# Patient Record
Sex: FEMALE | Race: WHITE | Employment: OTHER | URBAN - METROPOLITAN AREA
[De-identification: names, ages, dates, MRNs, and addresses within clinical notes are randomized per-mention and may not be internally consistent; named-entity substitution may affect disease eponyms.]

---

## 2018-11-07 ENCOUNTER — OFFICE VISIT (OUTPATIENT)
Dept: URGENT CARE | Facility: URGENT CARE | Age: 53
End: 2018-11-07
Payer: COMMERCIAL

## 2018-11-07 VITALS
OXYGEN SATURATION: 95 % | DIASTOLIC BLOOD PRESSURE: 93 MMHG | SYSTOLIC BLOOD PRESSURE: 134 MMHG | TEMPERATURE: 97.4 F | HEART RATE: 95 BPM

## 2018-11-07 DIAGNOSIS — S61.209A AVULSION OF FINGERTIP, INITIAL ENCOUNTER: Primary | ICD-10-CM

## 2018-11-07 PROCEDURE — 12001 RPR S/N/AX/GEN/TRNK 2.5CM/<: CPT

## 2018-11-07 NOTE — MR AVS SNAPSHOT
"              After Visit Summary   2018    My Busby    MRN: [id number]           Patient Information     Date Of Birth          1965        Visit Information        Provider Department      2018 8:40 PM [facility]ENT [facility]        Today's Diagnoses     Avulsion of fingertip, initial encounter    -  1       Follow-ups after your visit        Who to contact     If you have questions or need follow up information about today's clinic visit or your schedule please contact [facility] directly at [phone].  Normal or non-critical lab and imaging results will be communicated to you by Solaire Generationhart, letter or phone within 4 business days after the clinic has received the results. If you do not hear from us within 7 days, please contact the clinic through Shoes4yout or phone. If you have a critical or abnormal lab result, we will notify you by phone as soon as possible.  Submit refill requests through Note or call your pharmacy and they will forward the refill request to us. Please allow 3 business days for your refill to be completed.          Additional Information About Your Visit        MyChart Information     Note lets you send messages to your doctor, view your test results, renew your prescriptions, schedule appointments and more. To sign up, go to [email or web address] . Click on \"Log in\" on the left side of the screen, which will take you to the Welcome page. Then click on \"Sign up Now\" on the right side of the page.     You will be asked to enter the access code listed below, as well as some personal information. Please follow the directions to create your username and password.     Your access code is: [email or web address]  Expires: 2019  8:57 AM     Your access code will  in 90 days. If you need help or a new code, please call your [facility] or [phone].        Care EveryWhere ID     This is your Care EveryWhere ID. This " could be used by other organizations to access your [place] medical records  [id number]        Your Vitals Were     Pulse Temperature Pulse Oximetry             95 97.4  F (36.3  C) (Oral) 95%          Blood Pressure from Last 3 Encounters:   11/07/18 (!) 134/93   02/08/16 110/72    Weight from Last 3 Encounters:   02/08/16 145 lb (65.8 kg)              We Performed the Following     N BLOCK INJ, COMMON DIGIT        Primary Care Provider Office Phone # Fax #    [facility] [phone]       [address] [place] [address] [phone]  [facility] [id number]        Equal Access to Services     [facility] : Hadii aad gerard hadasho Soomaali, waaxda luqadaha, qaybta kaalmada adeegyada, miko faust adestefany dunham . So [facility] [phone].    ATENCIÓN: Si habla español, tiene a le disposición servicios gratuitos de asistencia lingüística. Llame al [phone].    We comply with applicable federal civil rights laws and [place] laws. We do not discriminate on the basis of race, color, national origin, age, disability, sex, sexual orientation, or gender identity.            Thank you!     Thank you for choosing [facility]  for your care. Our goal is always to provide you with excellent care. Hearing back from our patients is one way we can continue to improve our services. Please take a few minutes to complete the written survey that you may receive in the mail after your visit with us. Thank you!             Your Updated Medication List - Protect others around you: Learn how to safely use, store and throw away your medicines at www.disposemymeds.org.          This list is accurate as of 11/7/18 11:59 PM.  Always use your most recent med list.                   Brand Name Dispense Instructions for use Diagnosis    calcium carbonate 500 mg (elemental) 500 MG tablet    OS-JOELLE     Take 500 mg by mouth 2 times daily        OMEGA-3 FISH OIL PO           VITAMIN B-12 PO            VITAMIN B6 PO           VITAMIN D (CHOLECALCIFEROL) PO      Take by mouth daily        ZOLOFT PO      Take by mouth daily

## 2018-11-08 NOTE — PROGRESS NOTES
HPI  Chief Complaint   Patient presents with     Laceration     right index finger 4pm today       Mandolin slice of R index finger while at home today about 3 hours ago. Hasn't stopped bleeding yet   It is painful  She ran it under water for awhile after it happened   Got tetanus yesterday     Past Medical History:   Diagnosis Date     Depressive disorder      PONV (postoperative nausea and vomiting)      No family history on file.  Past Surgical History:   Procedure Laterality Date     ABDOMINOPLASTY       CHOLECYSTECTOMY       COLONOSCOPY N/A 2/8/2016    Procedure: COLONOSCOPY;  Surgeon: Sabra Velasquez MD;  Location:  GI     FLEXIBLE SIGMOIDOSCOPY  2011     TONSILLECTOMY       Social History   Substance Use Topics     Smoking status: Current Some Day Smoker     Smokeless tobacco: Never Used     Alcohol use Yes      Comment: 3 drinks a week     Current Outpatient Prescriptions   Medication Sig Dispense Refill     calcium carbonate (OS-JOELLE 500 MG [place]. [place]) 500 MG tablet Take 500 mg by mouth 2 times daily       Cyanocobalamin (VITAMIN B-12 PO)        Omega-3 Fatty Acids (OMEGA-3 FISH OIL PO)        Pyridoxine HCl (VITAMIN B6 PO)        Sertraline HCl (ZOLOFT PO) Take by mouth daily       VITAMIN D, CHOLECALCIFEROL, PO Take by mouth daily       No Known Allergies    Reviewed and updated as needed this visit by clinical staff and provider       ROS  Detailed as above       BP (!) 134/93 (BP Location: Left arm)  Pulse 95  Temp 97.4  F (36.3  C) (Oral)  SpO2 95%    Physical Exam   Constitutional: She is well-developed, well-nourished, and in no distress.   HENT:   Head: Normocephalic.   Pulmonary/Chest: Effort normal.   Musculoskeletal: Normal range of motion.   Neurological: She is alert.   Skin: Skin is warm and dry.   1cm in diameter Avulsion of right 2nd fingerpad with active bleeding   Psychiatric: Mood and affect normal.   Vitals reviewed.    Procedure:   Digital Block:  Medication: 1% Lidocaine  4mL  Location: right 2nd finger  Finger was cleansed. I injected the medication at the base of the finger. Optimal anesthesia was obtained.  Tip of finger was then cauterized with silver nitrate to obtain hemostasis.  Pt tolerated well.      Assessment and Plan:       ICD-10-CM    1. Avulsion of fingertip, initial encounter S61.209A N BLOCK INJ, COMMON DIGIT       Fingertip avulsion. Completed digital block and cautery as above   Covered with bandage   Discussed home cares   F/u prn       LOTTIE Rivera, CNP  [facility]

## 2021-03-24 ENCOUNTER — TRANSFERRED RECORDS (OUTPATIENT)
Dept: HEALTH INFORMATION MANAGEMENT | Facility: CLINIC | Age: 56
End: 2021-03-24

## 2021-05-24 DIAGNOSIS — Z11.59 ENCOUNTER FOR SCREENING FOR OTHER VIRAL DISEASES: ICD-10-CM

## 2021-06-10 DIAGNOSIS — Z11.59 ENCOUNTER FOR SCREENING FOR OTHER VIRAL DISEASES: ICD-10-CM

## 2021-06-10 LAB
LABORATORY COMMENT REPORT: NORMAL
SARS-COV-2 RNA RESP QL NAA+PROBE: NEGATIVE
SARS-COV-2 RNA RESP QL NAA+PROBE: NORMAL
SPECIMEN SOURCE: NORMAL
SPECIMEN SOURCE: NORMAL

## 2021-06-10 PROCEDURE — U0005 INFEC AGEN DETEC AMPLI PROBE: HCPCS | Performed by: ORTHOPAEDIC SURGERY

## 2021-06-10 PROCEDURE — U0003 INFECTIOUS AGENT DETECTION BY NUCLEIC ACID (DNA OR RNA); SEVERE ACUTE RESPIRATORY SYNDROME CORONAVIRUS 2 (SARS-COV-2) (CORONAVIRUS DISEASE [COVID-19]), AMPLIFIED PROBE TECHNIQUE, MAKING USE OF HIGH THROUGHPUT TECHNOLOGIES AS DESCRIBED BY CMS-2020-01-R: HCPCS | Performed by: ORTHOPAEDIC SURGERY

## 2021-06-11 RX ORDER — IBUPROFEN 200 MG
800 TABLET ORAL DAILY
Status: ON HOLD | COMMUNITY
End: 2021-06-15

## 2021-06-11 RX ORDER — CETIRIZINE HYDROCHLORIDE 10 MG/1
10 TABLET ORAL EVERY EVENING
COMMUNITY

## 2021-06-11 RX ORDER — MULTIVITAMIN WITH IRON
1 TABLET ORAL DAILY
COMMUNITY

## 2021-06-11 RX ORDER — GABAPENTIN 300 MG/1
900 CAPSULE ORAL EVERY EVENING
COMMUNITY

## 2021-06-14 ENCOUNTER — ANESTHESIA (OUTPATIENT)
Dept: SURGERY | Facility: CLINIC | Age: 56
End: 2021-06-14
Payer: COMMERCIAL

## 2021-06-14 ENCOUNTER — ANESTHESIA EVENT (OUTPATIENT)
Dept: SURGERY | Facility: CLINIC | Age: 56
End: 2021-06-14
Payer: COMMERCIAL

## 2021-06-14 ENCOUNTER — APPOINTMENT (OUTPATIENT)
Dept: GENERAL RADIOLOGY | Facility: CLINIC | Age: 56
End: 2021-06-14
Attending: SPECIALIST/TECHNOLOGIST
Payer: COMMERCIAL

## 2021-06-14 ENCOUNTER — HOSPITAL ENCOUNTER (OUTPATIENT)
Facility: CLINIC | Age: 56
Discharge: HOME | End: 2021-06-15
Attending: ORTHOPAEDIC SURGERY | Admitting: ORTHOPAEDIC SURGERY
Payer: COMMERCIAL

## 2021-06-14 DIAGNOSIS — Z96.642 STATUS POST TOTAL HIP REPLACEMENT, LEFT: Primary | ICD-10-CM

## 2021-06-14 PROBLEM — M16.12 OSTEOARTHRITIS OF LEFT HIP: Status: ACTIVE | Noted: 2021-06-14

## 2021-06-14 LAB
ABO + RH BLD: NORMAL
ABO + RH BLD: NORMAL
BLD GP AB SCN SERPL QL: NORMAL
BLOOD BANK CMNT PATIENT-IMP: NORMAL
SPECIMEN EXP DATE BLD: NORMAL

## 2021-06-14 PROCEDURE — 250N000011 HC RX IP 250 OP 636: Performed by: SPECIALIST/TECHNOLOGIST

## 2021-06-14 PROCEDURE — 258N000003 HC RX IP 258 OP 636: Performed by: ANESTHESIOLOGY

## 2021-06-14 PROCEDURE — 86900 BLOOD TYPING SEROLOGIC ABO: CPT | Performed by: ANESTHESIOLOGY

## 2021-06-14 PROCEDURE — 999N000141 HC STATISTIC PRE-PROCEDURE NURSING ASSESSMENT: Performed by: ORTHOPAEDIC SURGERY

## 2021-06-14 PROCEDURE — 250N000013 HC RX MED GY IP 250 OP 250 PS 637: Performed by: PHYSICIAN ASSISTANT

## 2021-06-14 PROCEDURE — 250N000013 HC RX MED GY IP 250 OP 250 PS 637: Performed by: SPECIALIST/TECHNOLOGIST

## 2021-06-14 PROCEDURE — 258N000003 HC RX IP 258 OP 636: Performed by: NURSE ANESTHETIST, CERTIFIED REGISTERED

## 2021-06-14 PROCEDURE — 250N000011 HC RX IP 250 OP 636: Performed by: NURSE ANESTHETIST, CERTIFIED REGISTERED

## 2021-06-14 PROCEDURE — 86901 BLOOD TYPING SEROLOGIC RH(D): CPT | Performed by: ANESTHESIOLOGY

## 2021-06-14 PROCEDURE — 272N000001 HC OR GENERAL SUPPLY STERILE: Performed by: ORTHOPAEDIC SURGERY

## 2021-06-14 PROCEDURE — 250N000009 HC RX 250: Performed by: NURSE ANESTHETIST, CERTIFIED REGISTERED

## 2021-06-14 PROCEDURE — 258N000003 HC RX IP 258 OP 636: Performed by: SPECIALIST/TECHNOLOGIST

## 2021-06-14 PROCEDURE — 86850 RBC ANTIBODY SCREEN: CPT | Performed by: ANESTHESIOLOGY

## 2021-06-14 PROCEDURE — 360N000077 HC SURGERY LEVEL 4, PER MIN: Performed by: ORTHOPAEDIC SURGERY

## 2021-06-14 PROCEDURE — 999N000063 XR PELVIS AND HIP PORTABLE LEFT 1 VIEW

## 2021-06-14 PROCEDURE — 250N000011 HC RX IP 250 OP 636: Performed by: ANESTHESIOLOGY

## 2021-06-14 PROCEDURE — 250N000009 HC RX 250: Performed by: ORTHOPAEDIC SURGERY

## 2021-06-14 PROCEDURE — 370N000017 HC ANESTHESIA TECHNICAL FEE, PER MIN: Performed by: ORTHOPAEDIC SURGERY

## 2021-06-14 PROCEDURE — C1776 JOINT DEVICE (IMPLANTABLE): HCPCS | Performed by: ORTHOPAEDIC SURGERY

## 2021-06-14 PROCEDURE — 710N000009 HC RECOVERY PHASE 1, LEVEL 1, PER MIN: Performed by: ORTHOPAEDIC SURGERY

## 2021-06-14 PROCEDURE — 36415 COLL VENOUS BLD VENIPUNCTURE: CPT | Performed by: ANESTHESIOLOGY

## 2021-06-14 DEVICE — ADM- CUP W/HA
Type: IMPLANTABLE DEVICE | Site: HIP | Status: FUNCTIONAL
Brand: RESTORATION

## 2021-06-14 DEVICE — 127 DEGREE NECK ANGLE HIP STEM
Type: IMPLANTABLE DEVICE | Site: HIP | Status: FUNCTIONAL
Brand: ACCOLADE

## 2021-06-14 DEVICE — CERAMIC V40 FEMORAL HEAD
Type: IMPLANTABLE DEVICE | Site: HIP | Status: FUNCTIONAL
Brand: BIOLOX

## 2021-06-14 RX ORDER — DIPHENHYDRAMINE HCL 25 MG
25 CAPSULE ORAL EVERY 6 HOURS PRN
Status: DISCONTINUED | OUTPATIENT
Start: 2021-06-14 | End: 2021-06-15 | Stop reason: HOSPADM

## 2021-06-14 RX ORDER — NALOXONE HYDROCHLORIDE 0.4 MG/ML
0.4 INJECTION, SOLUTION INTRAMUSCULAR; INTRAVENOUS; SUBCUTANEOUS
Status: DISCONTINUED | OUTPATIENT
Start: 2021-06-14 | End: 2021-06-15 | Stop reason: HOSPADM

## 2021-06-14 RX ORDER — SERTRALINE HYDROCHLORIDE 100 MG/1
100 TABLET, FILM COATED ORAL EVERY MORNING
Status: DISCONTINUED | OUTPATIENT
Start: 2021-06-15 | End: 2021-06-15 | Stop reason: HOSPADM

## 2021-06-14 RX ORDER — ONDANSETRON 4 MG/1
4 TABLET, ORALLY DISINTEGRATING ORAL EVERY 30 MIN PRN
Status: DISCONTINUED | OUTPATIENT
Start: 2021-06-14 | End: 2021-06-14 | Stop reason: HOSPADM

## 2021-06-14 RX ORDER — NALOXONE HYDROCHLORIDE 0.4 MG/ML
0.2 INJECTION, SOLUTION INTRAMUSCULAR; INTRAVENOUS; SUBCUTANEOUS
Status: DISCONTINUED | OUTPATIENT
Start: 2021-06-14 | End: 2021-06-15 | Stop reason: HOSPADM

## 2021-06-14 RX ORDER — ONDANSETRON 2 MG/ML
INJECTION INTRAMUSCULAR; INTRAVENOUS PRN
Status: DISCONTINUED | OUTPATIENT
Start: 2021-06-14 | End: 2021-06-14

## 2021-06-14 RX ORDER — NALOXONE HYDROCHLORIDE 0.4 MG/ML
0.4 INJECTION, SOLUTION INTRAMUSCULAR; INTRAVENOUS; SUBCUTANEOUS
Status: DISCONTINUED | OUTPATIENT
Start: 2021-06-14 | End: 2021-06-14

## 2021-06-14 RX ORDER — AMOXICILLIN 250 MG
1 CAPSULE ORAL 2 TIMES DAILY
Status: DISCONTINUED | OUTPATIENT
Start: 2021-06-14 | End: 2021-06-15 | Stop reason: HOSPADM

## 2021-06-14 RX ORDER — PROPOFOL 10 MG/ML
INJECTION, EMULSION INTRAVENOUS PRN
Status: DISCONTINUED | OUTPATIENT
Start: 2021-06-14 | End: 2021-06-14

## 2021-06-14 RX ORDER — OXYCODONE HYDROCHLORIDE 5 MG/1
10 TABLET ORAL EVERY 4 HOURS PRN
Status: DISCONTINUED | OUTPATIENT
Start: 2021-06-14 | End: 2021-06-15 | Stop reason: HOSPADM

## 2021-06-14 RX ORDER — FAMOTIDINE 20 MG/1
20 TABLET, FILM COATED ORAL 2 TIMES DAILY
Status: DISCONTINUED | OUTPATIENT
Start: 2021-06-14 | End: 2021-06-15 | Stop reason: HOSPADM

## 2021-06-14 RX ORDER — LIDOCAINE HYDROCHLORIDE 20 MG/ML
INJECTION, SOLUTION INFILTRATION; PERINEURAL PRN
Status: DISCONTINUED | OUTPATIENT
Start: 2021-06-14 | End: 2021-06-14

## 2021-06-14 RX ORDER — NALOXONE HYDROCHLORIDE 0.4 MG/ML
0.2 INJECTION, SOLUTION INTRAMUSCULAR; INTRAVENOUS; SUBCUTANEOUS
Status: DISCONTINUED | OUTPATIENT
Start: 2021-06-14 | End: 2021-06-14

## 2021-06-14 RX ORDER — PROCHLORPERAZINE MALEATE 10 MG
10 TABLET ORAL EVERY 6 HOURS PRN
Status: DISCONTINUED | OUTPATIENT
Start: 2021-06-14 | End: 2021-06-15 | Stop reason: HOSPADM

## 2021-06-14 RX ORDER — ACETAMINOPHEN 325 MG/1
975 TABLET ORAL ONCE
Status: COMPLETED | OUTPATIENT
Start: 2021-06-14 | End: 2021-06-14

## 2021-06-14 RX ORDER — BUPIVACAINE HYDROCHLORIDE 7.5 MG/ML
INJECTION, SOLUTION INTRASPINAL PRN
Status: DISCONTINUED | OUTPATIENT
Start: 2021-06-14 | End: 2021-06-14

## 2021-06-14 RX ORDER — ACETAMINOPHEN 325 MG/1
650 TABLET ORAL EVERY 4 HOURS PRN
Status: DISCONTINUED | OUTPATIENT
Start: 2021-06-17 | End: 2021-06-15 | Stop reason: HOSPADM

## 2021-06-14 RX ORDER — CEFAZOLIN SODIUM 2 G/100ML
2 INJECTION, SOLUTION INTRAVENOUS SEE ADMIN INSTRUCTIONS
Status: DISCONTINUED | OUTPATIENT
Start: 2021-06-14 | End: 2021-06-14 | Stop reason: HOSPADM

## 2021-06-14 RX ORDER — SODIUM CHLORIDE, SODIUM LACTATE, POTASSIUM CHLORIDE, CALCIUM CHLORIDE 600; 310; 30; 20 MG/100ML; MG/100ML; MG/100ML; MG/100ML
INJECTION, SOLUTION INTRAVENOUS CONTINUOUS
Status: DISCONTINUED | OUTPATIENT
Start: 2021-06-14 | End: 2021-06-15 | Stop reason: HOSPADM

## 2021-06-14 RX ORDER — PROPOFOL 10 MG/ML
INJECTION, EMULSION INTRAVENOUS CONTINUOUS PRN
Status: DISCONTINUED | OUTPATIENT
Start: 2021-06-14 | End: 2021-06-14

## 2021-06-14 RX ORDER — BISACODYL 10 MG
10 SUPPOSITORY, RECTAL RECTAL DAILY PRN
Status: DISCONTINUED | OUTPATIENT
Start: 2021-06-14 | End: 2021-06-15 | Stop reason: HOSPADM

## 2021-06-14 RX ORDER — CEFAZOLIN SODIUM 1 G/3ML
1 INJECTION, POWDER, FOR SOLUTION INTRAMUSCULAR; INTRAVENOUS EVERY 8 HOURS
Status: COMPLETED | OUTPATIENT
Start: 2021-06-14 | End: 2021-06-15

## 2021-06-14 RX ORDER — OXYCODONE HYDROCHLORIDE 5 MG/1
5 TABLET ORAL EVERY 4 HOURS PRN
Status: DISCONTINUED | OUTPATIENT
Start: 2021-06-14 | End: 2021-06-15 | Stop reason: HOSPADM

## 2021-06-14 RX ORDER — ACETAMINOPHEN 500 MG
1500 TABLET ORAL EVERY EVENING
Status: ON HOLD | COMMUNITY
End: 2021-06-15

## 2021-06-14 RX ORDER — CEFAZOLIN SODIUM 2 G/100ML
2 INJECTION, SOLUTION INTRAVENOUS
Status: COMPLETED | OUTPATIENT
Start: 2021-06-14 | End: 2021-06-14

## 2021-06-14 RX ORDER — TRANEXAMIC ACID 650 MG/1
1950 TABLET ORAL ONCE
Status: COMPLETED | OUTPATIENT
Start: 2021-06-14 | End: 2021-06-14

## 2021-06-14 RX ORDER — GABAPENTIN 300 MG/1
900 CAPSULE ORAL EVERY EVENING
Status: DISCONTINUED | OUTPATIENT
Start: 2021-06-14 | End: 2021-06-15 | Stop reason: HOSPADM

## 2021-06-14 RX ORDER — DOCUSATE SODIUM 100 MG/1
100 CAPSULE, LIQUID FILLED ORAL 2 TIMES DAILY
Status: DISCONTINUED | OUTPATIENT
Start: 2021-06-14 | End: 2021-06-15 | Stop reason: HOSPADM

## 2021-06-14 RX ORDER — FENTANYL CITRATE 50 UG/ML
INJECTION, SOLUTION INTRAMUSCULAR; INTRAVENOUS PRN
Status: DISCONTINUED | OUTPATIENT
Start: 2021-06-14 | End: 2021-06-14

## 2021-06-14 RX ORDER — POLYETHYLENE GLYCOL 3350 17 G/17G
17 POWDER, FOR SOLUTION ORAL DAILY
Status: DISCONTINUED | OUTPATIENT
Start: 2021-06-15 | End: 2021-06-15 | Stop reason: HOSPADM

## 2021-06-14 RX ORDER — HYDROMORPHONE HYDROCHLORIDE 1 MG/ML
.3-.5 INJECTION, SOLUTION INTRAMUSCULAR; INTRAVENOUS; SUBCUTANEOUS EVERY 5 MIN PRN
Status: DISCONTINUED | OUTPATIENT
Start: 2021-06-14 | End: 2021-06-14 | Stop reason: HOSPADM

## 2021-06-14 RX ORDER — DEXAMETHASONE SODIUM PHOSPHATE 4 MG/ML
10 INJECTION, SOLUTION INTRA-ARTICULAR; INTRALESIONAL; INTRAMUSCULAR; INTRAVENOUS; SOFT TISSUE ONCE
Status: COMPLETED | OUTPATIENT
Start: 2021-06-14 | End: 2021-06-14

## 2021-06-14 RX ORDER — HYDROMORPHONE HCL IN WATER/PF 6 MG/30 ML
0.4 PATIENT CONTROLLED ANALGESIA SYRINGE INTRAVENOUS
Status: DISCONTINUED | OUTPATIENT
Start: 2021-06-14 | End: 2021-06-15 | Stop reason: HOSPADM

## 2021-06-14 RX ORDER — FENTANYL CITRATE 50 UG/ML
25-50 INJECTION, SOLUTION INTRAMUSCULAR; INTRAVENOUS
Status: DISCONTINUED | OUTPATIENT
Start: 2021-06-14 | End: 2021-06-14 | Stop reason: HOSPADM

## 2021-06-14 RX ORDER — SODIUM CHLORIDE, SODIUM LACTATE, POTASSIUM CHLORIDE, CALCIUM CHLORIDE 600; 310; 30; 20 MG/100ML; MG/100ML; MG/100ML; MG/100ML
INJECTION, SOLUTION INTRAVENOUS CONTINUOUS
Status: DISCONTINUED | OUTPATIENT
Start: 2021-06-14 | End: 2021-06-14 | Stop reason: HOSPADM

## 2021-06-14 RX ORDER — HYDROMORPHONE HCL IN WATER/PF 6 MG/30 ML
0.2 PATIENT CONTROLLED ANALGESIA SYRINGE INTRAVENOUS
Status: DISCONTINUED | OUTPATIENT
Start: 2021-06-14 | End: 2021-06-15 | Stop reason: HOSPADM

## 2021-06-14 RX ORDER — ONDANSETRON 4 MG/1
4 TABLET, ORALLY DISINTEGRATING ORAL EVERY 6 HOURS PRN
Status: DISCONTINUED | OUTPATIENT
Start: 2021-06-14 | End: 2021-06-15 | Stop reason: HOSPADM

## 2021-06-14 RX ORDER — ASPIRIN 81 MG/1
81 TABLET ORAL 2 TIMES DAILY WITH MEALS
Status: DISCONTINUED | OUTPATIENT
Start: 2021-06-14 | End: 2021-06-15 | Stop reason: HOSPADM

## 2021-06-14 RX ORDER — ONDANSETRON 2 MG/ML
4 INJECTION INTRAMUSCULAR; INTRAVENOUS EVERY 6 HOURS PRN
Status: DISCONTINUED | OUTPATIENT
Start: 2021-06-14 | End: 2021-06-15 | Stop reason: HOSPADM

## 2021-06-14 RX ORDER — ACETAMINOPHEN 325 MG/1
975 TABLET ORAL EVERY 8 HOURS SCHEDULED
Status: DISCONTINUED | OUTPATIENT
Start: 2021-06-14 | End: 2021-06-15 | Stop reason: HOSPADM

## 2021-06-14 RX ORDER — ONDANSETRON 2 MG/ML
4 INJECTION INTRAMUSCULAR; INTRAVENOUS EVERY 30 MIN PRN
Status: DISCONTINUED | OUTPATIENT
Start: 2021-06-14 | End: 2021-06-14 | Stop reason: HOSPADM

## 2021-06-14 RX ORDER — LIDOCAINE 40 MG/G
CREAM TOPICAL
Status: DISCONTINUED | OUTPATIENT
Start: 2021-06-14 | End: 2021-06-15 | Stop reason: HOSPADM

## 2021-06-14 RX ADMIN — SODIUM CHLORIDE, POTASSIUM CHLORIDE, SODIUM LACTATE AND CALCIUM CHLORIDE: 600; 310; 30; 20 INJECTION, SOLUTION INTRAVENOUS at 19:43

## 2021-06-14 RX ADMIN — PHENYLEPHRINE HYDROCHLORIDE 50 MCG: 10 INJECTION INTRAVENOUS at 14:58

## 2021-06-14 RX ADMIN — CEFAZOLIN 1 G: 1 INJECTION, POWDER, FOR SOLUTION INTRAMUSCULAR; INTRAVENOUS at 21:35

## 2021-06-14 RX ADMIN — PHENYLEPHRINE HYDROCHLORIDE 50 MCG: 10 INJECTION INTRAVENOUS at 14:47

## 2021-06-14 RX ADMIN — ONDANSETRON 4 MG: 2 INJECTION INTRAMUSCULAR; INTRAVENOUS at 15:16

## 2021-06-14 RX ADMIN — MIDAZOLAM 1 MG: 1 INJECTION INTRAMUSCULAR; INTRAVENOUS at 14:11

## 2021-06-14 RX ADMIN — ASPIRIN 81 MG: 81 TABLET, COATED ORAL at 19:41

## 2021-06-14 RX ADMIN — PHENYLEPHRINE HYDROCHLORIDE 50 MCG: 10 INJECTION INTRAVENOUS at 14:52

## 2021-06-14 RX ADMIN — PHENYLEPHRINE HYDROCHLORIDE 50 MCG: 10 INJECTION INTRAVENOUS at 14:35

## 2021-06-14 RX ADMIN — PROPOFOL 120 MCG/KG/MIN: 10 INJECTION, EMULSION INTRAVENOUS at 14:15

## 2021-06-14 RX ADMIN — PHENYLEPHRINE HYDROCHLORIDE 100 MCG: 10 INJECTION INTRAVENOUS at 15:05

## 2021-06-14 RX ADMIN — PHENYLEPHRINE HYDROCHLORIDE 50 MCG: 10 INJECTION INTRAVENOUS at 14:26

## 2021-06-14 RX ADMIN — BUPIVACAINE HYDROCHLORIDE IN DEXTROSE 1.6 ML: 7.5 INJECTION, SOLUTION SUBARACHNOID at 14:14

## 2021-06-14 RX ADMIN — FENTANYL CITRATE 25 MCG: 50 INJECTION, SOLUTION INTRAMUSCULAR; INTRAVENOUS at 14:27

## 2021-06-14 RX ADMIN — FAMOTIDINE 20 MG: 20 TABLET ORAL at 21:34

## 2021-06-14 RX ADMIN — CEFAZOLIN SODIUM 2 G: 2 INJECTION, SOLUTION INTRAVENOUS at 14:12

## 2021-06-14 RX ADMIN — PHENYLEPHRINE HYDROCHLORIDE 50 MCG: 10 INJECTION INTRAVENOUS at 14:19

## 2021-06-14 RX ADMIN — PHENYLEPHRINE HYDROCHLORIDE 50 MCG: 10 INJECTION INTRAVENOUS at 14:37

## 2021-06-14 RX ADMIN — LIDOCAINE HYDROCHLORIDE 40 MG: 20 INJECTION, SOLUTION INFILTRATION; PERINEURAL at 14:15

## 2021-06-14 RX ADMIN — SODIUM CHLORIDE, POTASSIUM CHLORIDE, SODIUM LACTATE AND CALCIUM CHLORIDE: 600; 310; 30; 20 INJECTION, SOLUTION INTRAVENOUS at 15:43

## 2021-06-14 RX ADMIN — TRANEXAMIC ACID 1950 MG: 650 TABLET ORAL at 12:03

## 2021-06-14 RX ADMIN — ACETAMINOPHEN 975 MG: 325 TABLET, FILM COATED ORAL at 12:03

## 2021-06-14 RX ADMIN — PHENYLEPHRINE HYDROCHLORIDE 50 MCG: 10 INJECTION INTRAVENOUS at 14:42

## 2021-06-14 RX ADMIN — GABAPENTIN 900 MG: 300 CAPSULE ORAL at 21:34

## 2021-06-14 RX ADMIN — DEXAMETHASONE SODIUM PHOSPHATE 10 MG: 4 INJECTION, SOLUTION INTRA-ARTICULAR; INTRALESIONAL; INTRAMUSCULAR; INTRAVENOUS; SOFT TISSUE at 14:23

## 2021-06-14 RX ADMIN — SODIUM CHLORIDE, POTASSIUM CHLORIDE, SODIUM LACTATE AND CALCIUM CHLORIDE: 600; 310; 30; 20 INJECTION, SOLUTION INTRAVENOUS at 12:49

## 2021-06-14 RX ADMIN — PHENYLEPHRINE HYDROCHLORIDE 0.3 MCG/KG/MIN: 10 INJECTION INTRAVENOUS at 15:14

## 2021-06-14 RX ADMIN — ACETAMINOPHEN 975 MG: 325 TABLET, FILM COATED ORAL at 19:41

## 2021-06-14 RX ADMIN — MIDAZOLAM 1 MG: 1 INJECTION INTRAMUSCULAR; INTRAVENOUS at 14:02

## 2021-06-14 RX ADMIN — SENNOSIDES AND DOCUSATE SODIUM 1 TABLET: 8.6; 5 TABLET ORAL at 21:34

## 2021-06-14 RX ADMIN — DOCUSATE SODIUM 100 MG: 100 CAPSULE, LIQUID FILLED ORAL at 21:34

## 2021-06-14 RX ADMIN — OXYCODONE HYDROCHLORIDE 5 MG: 5 TABLET ORAL at 19:41

## 2021-06-14 RX ADMIN — PHENYLEPHRINE HYDROCHLORIDE 50 MCG: 10 INJECTION INTRAVENOUS at 14:29

## 2021-06-14 ASSESSMENT — ENCOUNTER SYMPTOMS
DYSRHYTHMIAS: 0
SEIZURES: 0

## 2021-06-14 ASSESSMENT — LIFESTYLE VARIABLES: TOBACCO_USE: 1

## 2021-06-14 ASSESSMENT — MIFFLIN-ST. JEOR: SCORE: 1269

## 2021-06-14 NOTE — ANESTHESIA PROCEDURE NOTES
Intrathecal Procedure Note  Pre-Procedure   Staff -        Anesthesiologist:  Stephen Corona MD       Performed By: anesthesiologist       Location: OR       Pre-Anesthestic Checklist: patient identified, IV checked, site marked, risks and benefits discussed, informed consent, monitors and equipment checked, pre-op evaluation and at physician/surgeon's request  Timeout:       Correct Patient: Yes        Correct Procedure: Yes        Correct Site: Yes        Correct Position: Yes   Procedure Documentation  Procedure: intrathecal       Patient Position: sitting       Skin prep: Betadine       Insertion Site: L2-3. (midline approach).       Spinal Needle Type: Ayana tip       Introducer used       Introducer: 20 G      # of attempts: 1 and  # of redirects:     Assessment/Narrative         Paresthesias: No.       CSF fluid: clear.      Opening pressure was cmH2O while  Sitting.      Comments:  Patient sitting on edge of OR bed, lower back cleaned and prepped in sterile fashion with betadine. 1% lido used to numb area. Introducer placed, spinal needle through introducer. Appropriate flow of CSF and confirmed with aspiration via syringe. Spinal dose given, 12 mg 0.75% bupivacaine. No complications.

## 2021-06-14 NOTE — ANESTHESIA PREPROCEDURE EVALUATION
Anesthesia Pre-Procedure Evaluation    Patient: My Busby   MRN: [id number] : 1965        Preoperative Diagnosis: Osteoarthritis of left hip [M16.12]   Procedure : Procedure(s):  LEFT TOTAL HIP ARTHROPLASTY     Past Medical History:   Diagnosis Date     Arthritis     OA-hip     BENEDICT III (cervical intraepithelial neoplasia III)      Depressive disorder      Generalized anxiety disorder      Plantar fasciitis      PONV (postoperative nausea and vomiting)       Past Surgical History:   Procedure Laterality Date     ABDOMINOPLASTY       CHOLECYSTECTOMY       COLONOSCOPY N/A 2016    Procedure: COLONOSCOPY;  Surgeon: Sabra Velasquez MD;  Location:  GI     FLEXIBLE SIGMOIDOSCOPY       GYN SURGERY      LEEP     TONSILLECTOMY        No Known Allergies   Social History     Tobacco Use     Smoking status: Current Every Day Smoker     Smokeless tobacco: Never Used   Substance Use Topics     Alcohol use: Yes     Comment: 3 drinks a week      Wt Readings from Last 1 Encounters:   16 65.8 kg (145 lb)        Anesthesia Evaluation   Pt has had prior anesthetic. Type: General.    History of anesthetic complications  - PONV.      ROS/MED HX  ENT/Pulmonary:     (+) tobacco use, Current use,  (-) asthma and sleep apnea   Neurologic:     (+) no peripheral neuropathy  (-) no seizures and no CVA   Cardiovascular:    (-) hypertension, CAD and arrhythmias   METS/Exercise Tolerance:     Hematologic:       Musculoskeletal:   (+) arthritis,     GI/Hepatic:    (-) GERD   Renal/Genitourinary:    (-) renal disease   Endo:    (-) Type II DM and thyroid disease   Psychiatric/Substance Use:     (+) psychiatric history anxiety     Infectious Disease:    (-) Recent Fever   Malignancy:       Other:            Physical Exam    Airway  airway exam normal      Mallampati: III   TM distance: > 3 FB   Neck ROM: full   Mouth opening: > 3 cm    Respiratory Devices and Support         Dental  no notable dental history          Cardiovascular   cardiovascular exam normal          Pulmonary   pulmonary exam normal                OUTSIDE LABS:  CBC: No results found for: WBC, HGB, HCT, PLT  BMP: No results found for: NA, POTASSIUM, CHLORIDE, CO2, BUN, CR, GLC  COAGS: No results found for: PTT, INR, FIBR  POC: No results found for: BGM, HCG, HCGS  HEPATIC: No results found for: ALBUMIN, PROTTOTAL, ALT, AST, GGT, ALKPHOS, BILITOTAL, BILIDIRECT, REBA  OTHER: No results found for: PH, LACT, A1C, JOELLE, PHOS, MAG, LIPASE, AMYLASE, TSH, T4, T3, CRP, SED    Anesthesia Plan    ASA Status:  2   NPO Status:  NPO Appropriate    Anesthesia Type: Spinal.              Consents    Anesthesia Plan(s) and associated risks, benefits, and realistic alternatives discussed. Questions answered and patient/representative(s) expressed understanding.     - Discussed with:  Patient         Postoperative Care    Pain management: IV analgesics, Oral pain medications.   PONV prophylaxis: Ondansetron (or other 5HT-3)     Comments:                Noah Montalvo MD

## 2021-06-14 NOTE — ANESTHESIA CARE TRANSFER NOTE
Patient: My Busby    Procedure(s):  LEFT TOTAL HIP ARTHROPLASTY    Diagnosis: Osteoarthritis of left hip [M16.12]  Diagnosis Additional Information: No value filed.    Anesthesia Type:   Spinal     Note:    Oropharynx: oropharynx clear of all foreign objects  Level of Consciousness: awake  Oxygen Supplementation: face mask  Level of Supplemental Oxygen (L/min / FiO2): 6  Independent Airway: airway patency satisfactory and stable  Dentition: dentition unchanged  Vital Signs Stable: post-procedure vital signs reviewed and stable  Report to RN Given: handoff report given  Patient transferred to: PACU  Comments: At end of procedure, spontaneous respirations, patient alert to voice, able to follow commands. Oxygen via face mask at 6 liters per minute to PACU. Oxygen tubing connected to wall O2 in PACU, SpO2, NiBP, and EKG monitors and alarms on and functioning, report on patient's clinical status given to PACU RN, RN questions answered.  Handoff Report: Identifed the Patient, Identified the Reponsible Provider, Reviewed the pertinent medical history, Discussed the surgical course, Reviewed Intra-OP anesthesia mangement and issues during anesthesia, Set expectations for post-procedure period and Allowed opportunity for questions and acknowledgement of understanding      Vitals: (Last set prior to Anesthesia Care Transfer)  CRNA VITALS  6/14/2021 1534 - 6/14/2021 1609      6/14/2021             NIBP:  96/68    Pulse:  73    NIBP Mean:  74    SpO2:  100 %    Resp Rate (set):  10        Electronically Signed By: LOTTIE Lauren CRNA  June 14, 2021  4:09 PM

## 2021-06-14 NOTE — PROGRESS NOTES
PTA medications updated by Medication Scribe prior to surgery via phone call with patient (last doses completed by Nurse)     Medication history sources: Patient, Surescripts and H&P  In the past week, patient estimated taking medication this percent of the time: Greater than 90%  Adherence assessment: N/A Not Observed    Significant changes made to the medication list:  None      Additional medication history information:   None    Medication reconciliation completed by provider prior to medication history? No    Time spent in this activity: 20 minutes    The information provided in this note is only as accurate as the sources available at the time of update(s)      Prior to Admission medications    Medication Sig Last Dose Taking? Auth Provider   acetaminophen (TYLENOL) 500 MG tablet Take 1,500 mg by mouth every evening  at pm Yes Reported, Patient   calcium 600 MG tablet Take 600 mg by mouth daily   at am Yes Reported, Patient   cetirizine (ZYRTEC) 10 MG tablet Take 10 mg by mouth every evening   at pm Yes Reported, Patient   gabapentin (NEURONTIN) 300 MG capsule Take 900 mg by mouth every evening (3 x 300mg)   at pm Yes Reported, Patient   ibuprofen (ADVIL/MOTRIN) 200 MG tablet Take 800 mg by mouth daily 6/9/2021 Yes Reported, Patient   magnesium 250 MG tablet Take 1 tablet by mouth daily  at am Yes Reported, Patient   Omega-3 Fatty Acids (OMEGA-3 FISH OIL PO) Take 550 mg by mouth daily   Yes Reported, Patient   sertraline (ZOLOFT) 100 MG tablet Take 100 mg by mouth every morning   at am Yes Reported, Patient   vitamin B complex with vitamin C (VITAMIN  B COMPLEX) tablet Take 1 tablet by mouth daily  at am Yes Reported, Patient   Vitamin D (Cholecalciferol) 50 MCG (2000 UT) CAPS Take 2,000 Units by mouth daily   Yes Reported, Patient

## 2021-06-14 NOTE — PROCEDURES
OPERATIVE REPORT - Jair/BECKY    DATE OF SERVICE:  6/14/2021    ATTENDING: DEBBIE LOPEZ    SURGEON:  DEBBIE LOPEZ    ASSISTANT:   Paulina Aponte [facility]    PREOPERATIVE DIAGNOSIS:  Left hip osteoarthritis    POSTOPERATIVE DIAGNOSIS:    same    PROCEDURES PERFORMED:   Left Total Hip Arthroplasty     ANESTHESIA:  Spinal    ESTIMATED BLOOD LOSS:   100 mL    TRANSFUSIONS:  none    FLUIDS:   Per anesthesia    SPECIMENS:  Arthroplasty resection materials.    DRAIN:  none    COMPLICATIONS:  none    INDICATIONS:   The patient is a very pleasant 55 year old female who has had persistent complaints of hip pain for some time now. The pain interferes with activities of daily living including sitting, standing, and ambulating short distances. The physical examination showed a painful and limited range of motion of the left hip. The x-ray showed bone-on-bone arthritis in the left hip.      The patient has tried nonoperative measures to control his pain including Tylenol, oral anti-inflammatories, activity modification, low impact exercises, assistive devices, injections, none of which have provided satisfactory pain relief. The patient desires joint replacement of the hip to improve their lifestyle and reduce their pain.      Preoperatively, the risks, benefits, and possible complications of the procedure were discussed. The risks discussed included but were not limited to wear, loosening, infection, neurovascular damage, thromboembolic disease, foot drop, limb length inequality, persistent pain, stiffness and dislocation. Issues specific to hip resurfacing were also discussed and included but were not limited to metal ion concerns and femoral neck fractures.  All of the questions were satisfactorily answered and the patient wished to proceed with joint replacement surgery to improve their lifestyle and reduce their pain.        IMPLANTS:  1. Acetabular component:  Herson ADM Left 46 mm O.D.  2. Femoral  component: Ol[place] Accolade II size 5 35 mm 127 degree  3. Femoral head:  [place] ADM 46/28 mm +0 mm Biolox    PROCEDURE:  The patient was brought to the operating room on a bed.  After adequate induction of spinal anesthesia, the patient was rolled into the decubitus position with the left side up.  All bony prominences were padded and an axillary roll was placed.  The hip and leg were then prepped and draped free in the usual sterile fashion using a Betadine scrub and a ChloraPrep paint.    At this point a time-out procedure was carried out to identify the patient, the appropriate operative side, the implants, the code status, the fire risk, the preoperative medications and to confirm that the patient received 2 grams of ancef within one hour of the onset of the procedure.  Following completion of this, we proceeded with the case    A modified Kocher-Schmidt exposure of the hip was utilized.  Skin, subcutaneous tissue and fascia was incised at the interval between the tensor fascia neeta and the gluteus oswald as best determined and retracted.  The piriformis tendon was identified, tagged and divided at its trochanteric insertion and retracted posteriorly for protection of the sciatic nerve.   The gluteus minimus was elevated from the posterior capsule and retracted anteriorly. A posterior capsulotomy was performed in the shape of a T, dividing the short external rotators at the trochanteric insertion and protecting the gluteus minimus.  The flaps were tagged and retracted allowing for posterior dislocation of the femoral head.  Femoral neck osteotomy was performed using appropriate template and oscillating saw. Femoral head was delivered from the wound and attention was turned to the femur.    Access to the intramedullary canal was gained using the tapered awl.  The femur was serial broached up to a size 5. It seated down and was just slightly proud to our calcar surface.   The broach was axially and rotationally  stable. The broach was removed.  A femoral wick was placed to aid in hemostasis    The labrum and soft tissue were removed from the bony acetabulum rim.  The pulvinar and osteophytes were removed from the Haversian notch.  The acetabulum was sequentially reamed in 2 and 1 mm increments up to a 46 mm reamer. The acetabulum had exposed bleeding subchondral bone. Appropriate position, fixation and coverage for a 46 mm acetabular shell was confirmed.  The shell was brought up and impacted it into position. The acetabular shell was stable with impaction. The peripheral rim osteophytes were removed using a curved half-inch osteotome. Attention was turned to trialing.    The trial femoral component was inserted again. The 35 mm 127 degree offset neck trial was used.  A 46/28 mm head with a +0 mm neck were used. The limb lengths clinically appeared to be identical.   The hip was stable in flexion, adduction, internal rotation as well as extension and external rotation.      Satisfied with the component alignment, hip stability, limb lengths and range of motion, the hip was dislocated.  The trial components were removed.  The size 5 femoral component was inserted to a stable position.   It seated down to the same level as the broach.  The stem was stable to both axial and rotational stresses. The [place] taper was cleaned and dried. The final 46/28 mm outer diameter head with a +0 mm neck was impacted into place. The acetabulum was inspected to ensure it was free of debris. The hip was reduced in an atraumatic fashion. The limb lengths was checked. The length of the legs clinically appeared to be near identical.  The hip was stable on flexion, adduction, internal rotation as well as extension, external rotation. The wounds were copiously irrigated and began a layered closure was performed.      The posterior capsule was closed with #1 Vicryl sutures in an interrupted fashion. Piriform tendon was reattached to the posterior  superior aspect of the greater trochanter using a #1 Vicryl suture.  The fascia was closed using #1 Vicryl sutures in an interrupted fashion. The subcutaneous tissue was then closed in two layers using 0 and 2-0 Vicryl sutures.  The skin was brought together, everted and approximated with staples.  Sterile dressings were applied.  Pillows were placed between the legs.  The patient was rolled supine and transported to the PACU in stable condition. At the end of the case, sponge and needle counts were correct.     Hemostasis was obtained throughout the procedure and prior to closure of each layer using electrocautery.  Pulsatile irrigation and dilute betadine irrigation were used during the procedure.   Surgical team body exhaust systems and high exchange air flow were used during the procedure as well.  The patient received 2 grams of ancef prior to the onset of the procedure for antibiotic prophylaxis.      POSTOPERATIVE PLAN:   1. WBAT for the left lower extremity.   2. Antibiotic Prophylaxis were given 2 grams of ancef. Antibiotics were given within 1 hour of surgical incision and discontinued within 24 hours.  3. Pain control with Oxycodone, Tylenol and Mobic or Advil.  4. Follow up with Dr. Dumont in 10-14 days. [phone].   5.  DVT prophylaxis aspirin and sequential compression devices will be started within 24 hours of surgery.  6. Plan discharge when meeting therapy goals and patient is medically stable  7. Transfusion requirements to be allogenic/autogenic in nature.   8. Caution with NSAID usage.    Pelon Dumont MD  [facility]  [phone]  [phone]    Primary Care Physician Mila Lawson

## 2021-06-14 NOTE — ANESTHESIA POSTPROCEDURE EVALUATION
Patient: My Busby    Procedure(s):  LEFT TOTAL HIP ARTHROPLASTY    Diagnosis:Osteoarthritis of left hip [M16.12]  Diagnosis Additional Information: No value filed.    Anesthesia Type:  Spinal    Note:  Disposition: Inpatient   Postop Pain Control: Uneventful            Sign Out: Well controlled pain   PONV: No   Neuro/Psych: Uneventful            Sign Out: Acceptable/Baseline neuro status   Airway/Respiratory: Uneventful            Sign Out: Acceptable/Baseline resp. status   CV/Hemodynamics: Uneventful            Sign Out: Acceptable CV status; No obvious hypovolemia; No obvious fluid overload   Other NRE: NONE   DID A NON-ROUTINE EVENT OCCUR? No           Last vitals:  Vitals:    06/14/21 1645 06/14/21 1650 06/14/21 1700   BP:  98/64 113/77   Pulse:  64 71   Resp: 20 25 (P) 20   Temp:      SpO2: 98%         Last vitals prior to Anesthesia Care Transfer:  CRNA VITALS  6/14/2021 1534 - 6/14/2021 1634      6/14/2021             Resp Rate (observed):  14          Electronically Signed By: Waqar Stewart MD  June 14, 2021  5:16 PM

## 2021-06-15 ENCOUNTER — APPOINTMENT (OUTPATIENT)
Dept: OCCUPATIONAL THERAPY | Facility: CLINIC | Age: 56
End: 2021-06-15
Attending: ORTHOPAEDIC SURGERY
Payer: COMMERCIAL

## 2021-06-15 ENCOUNTER — APPOINTMENT (OUTPATIENT)
Dept: PHYSICAL THERAPY | Facility: CLINIC | Age: 56
End: 2021-06-15
Attending: ORTHOPAEDIC SURGERY
Payer: COMMERCIAL

## 2021-06-15 VITALS
HEART RATE: 50 BPM | BODY MASS INDEX: 26.12 KG/M2 | TEMPERATURE: 97.7 F | HEIGHT: 64 IN | WEIGHT: 153 LBS | RESPIRATION RATE: 16 BRPM | SYSTOLIC BLOOD PRESSURE: 94 MMHG | OXYGEN SATURATION: 95 % | DIASTOLIC BLOOD PRESSURE: 51 MMHG

## 2021-06-15 LAB
GLUCOSE SERPL-MCNC: 130 MG/DL (ref 70–99)
HGB BLD-MCNC: 11.7 G/DL (ref 11.7–15.7)

## 2021-06-15 PROCEDURE — 97535 SELF CARE MNGMENT TRAINING: CPT | Mod: GO | Performed by: OCCUPATIONAL THERAPIST

## 2021-06-15 PROCEDURE — 97161 PT EVAL LOW COMPLEX 20 MIN: CPT | Mod: GP

## 2021-06-15 PROCEDURE — 97165 OT EVAL LOW COMPLEX 30 MIN: CPT | Mod: GO | Performed by: OCCUPATIONAL THERAPIST

## 2021-06-15 PROCEDURE — 36415 COLL VENOUS BLD VENIPUNCTURE: CPT | Performed by: SPECIALIST/TECHNOLOGIST

## 2021-06-15 PROCEDURE — 250N000013 HC RX MED GY IP 250 OP 250 PS 637: Performed by: PHYSICIAN ASSISTANT

## 2021-06-15 PROCEDURE — 85018 HEMOGLOBIN: CPT | Performed by: SPECIALIST/TECHNOLOGIST

## 2021-06-15 PROCEDURE — 97110 THERAPEUTIC EXERCISES: CPT | Mod: GP

## 2021-06-15 PROCEDURE — 250N000011 HC RX IP 250 OP 636: Performed by: SPECIALIST/TECHNOLOGIST

## 2021-06-15 PROCEDURE — 97116 GAIT TRAINING THERAPY: CPT | Mod: GP

## 2021-06-15 PROCEDURE — 250N000013 HC RX MED GY IP 250 OP 250 PS 637: Performed by: SPECIALIST/TECHNOLOGIST

## 2021-06-15 PROCEDURE — 82947 ASSAY GLUCOSE BLOOD QUANT: CPT | Performed by: SPECIALIST/TECHNOLOGIST

## 2021-06-15 RX ORDER — POLYETHYLENE GLYCOL 3350 17 G/17G
1 POWDER, FOR SOLUTION ORAL DAILY
Qty: 7 PACKET | Refills: 0 | Status: SHIPPED | OUTPATIENT
Start: 2021-06-15

## 2021-06-15 RX ORDER — OXYCODONE HYDROCHLORIDE 5 MG/1
5-10 TABLET ORAL EVERY 4 HOURS PRN
Qty: 30 TABLET | Refills: 0 | Status: SHIPPED | OUTPATIENT
Start: 2021-06-15

## 2021-06-15 RX ORDER — CELECOXIB 200 MG/1
200 CAPSULE ORAL DAILY
Qty: 14 CAPSULE | Refills: 0 | Status: SHIPPED | OUTPATIENT
Start: 2021-06-15 | End: 2021-06-29

## 2021-06-15 RX ORDER — ACETAMINOPHEN 325 MG/1
650 TABLET ORAL EVERY 4 HOURS PRN
Qty: 100 TABLET | Refills: 0 | Status: SHIPPED | OUTPATIENT
Start: 2021-06-15

## 2021-06-15 RX ORDER — AMOXICILLIN 250 MG
1-2 CAPSULE ORAL 2 TIMES DAILY
Qty: 30 TABLET | Refills: 0 | Status: SHIPPED | OUTPATIENT
Start: 2021-06-15

## 2021-06-15 RX ORDER — ASPIRIN 81 MG/1
81 TABLET ORAL 2 TIMES DAILY
Qty: 60 TABLET | Refills: 0 | Status: SHIPPED | OUTPATIENT
Start: 2021-06-15

## 2021-06-15 RX ADMIN — FAMOTIDINE 20 MG: 20 TABLET ORAL at 09:11

## 2021-06-15 RX ADMIN — DOCUSATE SODIUM 100 MG: 100 CAPSULE, LIQUID FILLED ORAL at 09:10

## 2021-06-15 RX ADMIN — OXYCODONE HYDROCHLORIDE 5 MG: 5 TABLET ORAL at 09:10

## 2021-06-15 RX ADMIN — ASPIRIN 81 MG: 81 TABLET, COATED ORAL at 09:10

## 2021-06-15 RX ADMIN — DIPHENHYDRAMINE HYDROCHLORIDE 25 MG: 25 CAPSULE ORAL at 00:23

## 2021-06-15 RX ADMIN — OXYCODONE HYDROCHLORIDE 5 MG: 5 TABLET ORAL at 00:24

## 2021-06-15 RX ADMIN — SENNOSIDES AND DOCUSATE SODIUM 1 TABLET: 8.6; 5 TABLET ORAL at 09:10

## 2021-06-15 RX ADMIN — SERTRALINE HYDROCHLORIDE 100 MG: 100 TABLET ORAL at 09:11

## 2021-06-15 RX ADMIN — POLYETHYLENE GLYCOL 3350 17 G: 17 POWDER, FOR SOLUTION ORAL at 09:09

## 2021-06-15 RX ADMIN — CEFAZOLIN 1 G: 1 INJECTION, POWDER, FOR SOLUTION INTRAMUSCULAR; INTRAVENOUS at 06:40

## 2021-06-15 RX ADMIN — OXYCODONE HYDROCHLORIDE 5 MG: 5 TABLET ORAL at 04:27

## 2021-06-15 RX ADMIN — ACETAMINOPHEN 975 MG: 325 TABLET, FILM COATED ORAL at 06:40

## 2021-06-15 NOTE — PROGRESS NOTES
06/15/21 1004   Quick Adds   Type of Visit Initial Occupational Therapy Evaluation   Living Environment   People in home spouse   Current Living Arrangements house   Home Accessibility stairs to enter home;stairs within home   Number of Stairs, Main Entrance 1   Number of Stairs, Within Home, Primary   (12 to 2nd level and basement)   Stair Railings, Within Home, Primary railings safe and in good condition   Transportation Anticipated family or friend will provide   Living Environment Comments Lives in 2 story house, bedroom on 2nd level, has both a walk-in shower and shower/tub combo   Self-Care   Usual Activity Tolerance excellent   Current Activity Tolerance good   Regular Exercise Yes   Activity/Exercise Type walking  (pilates )   Exercise Amount/Frequency 3-5 times/wk   Equipment Currently Used at Home walker, rolling;cane, straight;shower chair   Activity/Exercise/Self-Care Comment IND with all I/ADLs at baseline. Lives with  who is available to provide assistance as needed.     Disability/Function   Hearing Difficulty or Deaf no   Wear Glasses or Blind yes   Vision Management glasses   Fall history within last six months no   General Information   Onset of Illness/Injury or Date of Surgery 06/14/21   Referring Physician Paulina Arnold PA-C   Patient/Family Therapy Goal Statement (OT) Complete I/ADLs as IND with less pain   Additional Occupational Profile Info/Pertinent History of Current Problem My Busby is a 55 y.o. female s/p elective L BECKY on 6/14   Existing Precautions/Restrictions no hip IR   Left Lower Extremity (Weight-bearing Status) weight-bearing as tolerated (WBAT)   Cognitive Status Examination   Orientation Status orientation to person, place and time   Sensory   Sensory Quick Adds No deficits were identified   Pain Assessment   Patient Currently in Pain   (Reports pain is well controlled)   Integumentary/Edema   Integumentary/Edema no deficits were identifed   Posture    Posture not impaired   Range of Motion Comprehensive   Comment, General Range of Motion B UE WNL, L LE is limited s/p L BECKY   Strength Comprehensive (MMT)   Comment, General Manual Muscle Testing (MMT) Assessment B UE WNL, L LE is limited s/p L BECKY   Muscle Tone Assessment   Muscle Tone Quick Adds No deficits were identified   Coordination   Upper Extremity Coordination No deficits were identified   Bed Mobility   Bed Mobility rolling left;rolling right;supine-sit;sit-supine   Rolling Left [place] (Bed Mobility) verbal cues;supervision   Rolling Right [place] (Bed Mobility) supervision;verbal cues   Supine-Sit Moca (Bed Mobility) supervision   Sit-Supine [place] (Bed Mobility) verbal cues;supervision   Comment (Bed Mobility) From flat bed, no hand rails   Transfers   Transfers sit-stand transfer;toilet transfer   Sit-Stand Transfer   Sit-Stand M[place] (Transfers) supervision   Assistive Device (Sit-Stand Transfers) walker, front-wheeled   Toilet Transfer   Type (Toilet Transfer) sit-stand;stand-sit   [place] Level (Toilet Transfer) supervision   Toilet Transfer Comments To/from low toilet   Balance   Balance Assessment sitting balance: dynamic;standing balance: dynamic   Sitting Balance: Dynamic WFL   Standing Balance: Dynamic good balance   Activities of Daily Living   BADL Assessment/Intervention upper body dressing;lower body dressing;toileting   Upper Body Dressing Assessment/Training   [place] Level (Upper Body Dressing) independent   Position (Upper Body Dressing) edge of bed sitting   Lower Body Dressing Assessment/Training   [place] Level (Lower Body Dressing) verbal cues;supervision   Position (Lower Body Dressing) edge of bed sitting;supported standing   Toileting   [place] Level (Toileting) supervision   Position (Toileting) unsupported sitting   Clinical Impression   Criteria for Skilled Therapeutic Interventions Met (OT) yes;meets criteria;skilled  treatment is necessary   OT Diagnosis Impaired I/ADLs and mobility   OT Problem List-Impairments impacting ADL balance;mobility;range of motion (ROM);strength;post-surgical precautions   Assessment of Occupational Performance 1-3 Performance Deficits   Identified Performance Deficits ADLs, IADLs, Mobility   Planned Therapy Interventions (OT) ADL retraining;home program guidelines;progressive activity/exercise;risk factor education;transfer training   Clinical Decision Making Complexity (OT) low complexity   Therapy Frequency (OT) 1x eval and treat   Predicted Duration of Therapy 1 day   Risk & Benefits of therapy have been explained evaluation/treatment results reviewed;care plan/treatment goals reviewed;risks/benefits reviewed;current/potential barriers reviewed;participants voiced agreement with care plan;participants included;patient   OT Discharge Planning    OT Rationale for DC Rec Pt slightly below baseline for I/ADLs, requiring assistance for heavy housework and driving. Recommend assist for bathing safety and fxl transfers as needed.    OT Brief overview of current status  Transfers sit<>stand as mod I   Total Evaluation Time (Minutes)   Total Evaluation Time (Minutes) 10

## 2021-06-15 NOTE — PROGRESS NOTES
06/15/21 0830   Quick Adds   Type of Visit Initial PT Evaluation   Living Environment   People in home spouse   Current Living Arrangements house   Home Accessibility stairs to enter home;stairs within home   Number of Stairs, Main Entrance 1   Number of Stairs, Within Home, Primary other (see comments)  (12 to 2nd level and basement)   Stair Railings, Within Home, Primary railings safe and in good condition   Transportation Anticipated family or friend will provide   Self-Care   Usual Activity Tolerance excellent   Current Activity Tolerance good   Regular Exercise Yes   Activity/Exercise Type walking;other (see comments)  (pilates)   Exercise Amount/Frequency 3-5 times/wk   Equipment Currently Used at Home walker, rolling;cane, straight   Activity/Exercise/Self-Care Comment owns FWW/cane but did not use at baseline; IND at baseline   Disability/Function   Hearing Difficulty or Deaf no   Wear Glasses or Blind yes   Vision Management glasses   Concentrating, Remembering or Making Decisions Difficulty no   Difficulty Communicating no   Difficulty Eating/Swallowing no   Doing Errands Independently Difficulty (such as shopping) yes   Errands Management drives   Fall history within last six months no   Change in Functional Status Since Onset of Current Illness/Injury yes   General Information   Onset of Illness/Injury or Date of Surgery 06/14/21   Referring Physician Paulina Arnold PA-C   Patient/Family Therapy Goals Statement (PT) go home   Pertinent History of Current Problem (include personal factors and/or comorbidities that impact the POC) Pt is s/p elective L BECKY on 6/14   Existing Precautions/Restrictions fall   Cognition   Orientation Status (Cognition) oriented x 4   Affect/Mental Status (Cognition) WNL   Follows Commands (Cognition) WNL   Pain Assessment   Patient Currently in Pain Yes, see Vital Sign flowsheet  (2-3/10)   Posture    Posture Not impaired   Range of Motion (ROM)   ROM Comment WNL  except L hip   Strength   Strength Comments WNL except L hip   Bed Mobility   Comment (Bed Mobility) IND supine<>sit   Transfers   Transfer Safety Comments IND sit<>stand   Gait/Stairs (Locomotion)   [place] Level (Gait) supervision  (modIND after treatment)   Assistive Device (Gait) walker, front-wheeled   Distance in Feet (Required for LE Total Joints) 15ft + treatment   Pattern (Gait) step-through   Negotiation (Stairs) stairs independence;number of steps   [place] Level (Stairs) contact guard   Number of Steps (Stairs) 3   Comment (Gait/Stairs) Pt stable and had no LOB throughout all mobility with FWW   Balance   Balance Comments WFL with walker   Sensory Examination   Sensory Perception WFL   Clinical Impression   Criteria for Skilled Therapeutic Intervention yes, treatment indicated   PT Diagnosis (PT) impaired gait   Influenced by the following impairments s/p BECKY   Functional limitations due to impairments decreased IND with functional mobility   Clinical Presentation Stable/Uncomplicated   Clinical Decision Making (Complexity) low complexity   Therapy Frequency (PT) One time eval and treatment only   Predicted Duration of Therapy Intervention (days/wks) 1day   Planned Therapy Interventions (PT) gait training;stair training;strengthening;transfer training   Risk & Benefits of therapy have been explained evaluation/treatment results reviewed;care plan/treatment goals reviewed;participants voiced agreement with care plan   PT Discharge Planning    PT Discharge Recommendation (DC Rec) home with assist   PT Rationale for DC Rec Pt able to mobilize at level needed to access home with assist as needed for ADLs/IADLs.  SHe is modIND with FWW on level surfaces, supervision-SBA on stairs with railing, IND for bed mobility/transfers   Total Evaluation Time   Total Evaluation Time (Minutes) 15

## 2021-06-15 NOTE — PROGRESS NOTES
"ORTHOPEDIC LOWER EXTREMITY PROGRESS NOTE    POD#1  Patient is a 55 year old female who underwent Procedure(s):  LEFT TOTAL HIP ARTHROPLASTY on 2021 on left . Pain is well controlled. Denies nausea, vomiting or dizziness.  Discharge instructions reviewed.  She is eager to discharge.    Vitals:   Blood pressure 94/51, pulse 50, temperature 97.7  F (36.5  C), temperature source Oral, resp. rate 16, height 1.626 m (5' 4\"), weight 69.4 kg (153 lb), SpO2 95 %.  Temp (24hrs), Av  F (36.7  C), Min:97.3  F (36.3  C), Max:98.6  F (37  C)        Drains: none    EXAM   The patient is alert description: awake and alert.  Calves are soft and non-tender.   Sensation is intact.  Dorsiflexion and plantar flexion is intact.  Foot warm with nl cap refill.  The incision is incision: covered and with AG dressing with no shadow.     Labs:   Recent Labs   Lab Test 06/15/21  0719   HGB 11.7     ASSESSMENT  L BECKY   PLAN  1. DVT prophylaxis: anticoagulants: aspirin  2. Weight Bearing: weightbearing: WBAT (Weight bearing as tolerated).  3. Anticipated discharge date today . Discharge to discharge destination: Home with No Skilled Service.  4. Cont Pain Control pain medication: Oxycodone, Tylenol and Celebrex    Jany Orozco PA-C  [facility]        "

## 2021-06-15 NOTE — PROGRESS NOTES
hospitalist consult received; pt is a 56yo female with PMHx anxiety, depression, and OA of left hip who underwent previously scheduled BECKY on 6/14/2021. Chart, preop H&P reviewed. Pt does not have chronic medical conditions that require daily monitoring, hospitalist will sign-off. Call with concerns.    Joon Davidson PA-C  6/14/2021, 7:38 PM  Pager: [phone]

## 2021-06-15 NOTE — PLAN OF CARE
Physical Therapy Discharge Summary    Reason for therapy discharge:    All goals and outcomes met, no further needs identified.    Progress towards therapy goal(s). See goals on Care Plan in [facility] electronic health record for goal details.  Goals met    Therapy recommendation(s):    Continue home exercise program.

## 2021-06-15 NOTE — PROGRESS NOTES
Pt a/o cms intact,up with 1 walker belt.voiding pain relieved with meds. Dced with husbsnd paperwork meds and belonging.

## (undated) DEVICE — SU VICRYL 0 CT-1 CR 8X18" J740D

## (undated) DEVICE — SOLUTION WOUND CLEANSING 3/4OZ 10% PVP EA-L3011FB-50

## (undated) DEVICE — DRAPE CONVERTORS U-DRAPE 60X72" 8476

## (undated) DEVICE — ESU ELEC BLADE 6" COATED E1450-6

## (undated) DEVICE — PACK UNIVERSAL SPLIT 29131

## (undated) DEVICE — SU VICRYL 1 CTX CR 8X18" J765D

## (undated) DEVICE — GLOVE PROTEXIS POWDER FREE 7.0 ORTHOPEDIC 2D73ET70

## (undated) DEVICE — SOL WATER IRRIG 1000ML BOTTLE 2F7114

## (undated) DEVICE — SYR 03ML LL W/O NDL 309657

## (undated) DEVICE — GLOVE PROTEXIS BLUE W/NEU-THERA 7.0  2D73EB70

## (undated) DEVICE — DRAPE STERI TOWEL LG 1010

## (undated) DEVICE — BNDG COBAN 6"X5YDS STERILE

## (undated) DEVICE — SYR 30ML LL W/O NDL 302832

## (undated) DEVICE — PACK TOTAL HIP W/POUCH SOP15HPFSM

## (undated) DEVICE — PREP SKIN SCRUB TRAY 4461A

## (undated) DEVICE — DRAPE IOBAN INCISE 23X17" 6650EZ

## (undated) DEVICE — SU VICRYL 2-0 CT-1 27" UND J259H

## (undated) DEVICE — GLOVE PROTEXIS W/NEU-THERA 7.0  2D73TE70

## (undated) DEVICE — PREP CHLORAPREP 26ML TINTED ORANGE  260815

## (undated) DEVICE — MANIFOLD NEPTUNE 4 PORT 700-20

## (undated) DEVICE — ESU GROUND PAD UNIVERSAL W/O CORD

## (undated) DEVICE — GLOVE PROTEXIS BLUE W/NEU-THERA 8.0  2D73EB80

## (undated) DEVICE — SOL NACL 0.9% IRRIG 3000ML BAG 2B7477

## (undated) DEVICE — BLADE SAW SAGITTAL STRK MED WIDE 25X73X0.89MM 2108-105-000

## (undated) DEVICE — DRSG AQUACEL AG 3.5X13.75" HYDROFIBER 412012

## (undated) DEVICE — BONE WAX 2.5GM W31G

## (undated) DEVICE — GLOVE PROTEXIS POWDER FREE 7.5 ORTHOPEDIC 2D73ET75

## (undated) DEVICE — HOOD FLYTE W/PEELAWAY 408-800-100

## (undated) DEVICE — SUCTION IRR SYSTEM W/O TIP INTERPULSE HANDPIECE 0210-100-000

## (undated) DEVICE — LINEN TOWEL PACK X5 5464

## (undated) DEVICE — SOL NACL 0.9% INJ 1000ML BAG 2B1324X

## (undated) DEVICE — PAD FOAM MCGUIRE KIT 0814-0150

## (undated) DEVICE — NDL 22GA 1" 305155

## (undated) RX ORDER — ACETAMINOPHEN 325 MG/1
TABLET ORAL
Status: DISPENSED
Start: 2021-06-14

## (undated) RX ORDER — TRANEXAMIC ACID 650 MG/1
TABLET ORAL
Status: DISPENSED
Start: 2021-06-14

## (undated) RX ORDER — BUPIVACAINE HYDROCHLORIDE 5 MG/ML
INJECTION, SOLUTION EPIDURAL; INTRACAUDAL
Status: DISPENSED
Start: 2021-06-14

## (undated) RX ORDER — FENTANYL CITRATE 50 UG/ML
INJECTION, SOLUTION INTRAMUSCULAR; INTRAVENOUS
Status: DISPENSED
Start: 2021-06-14

## (undated) RX ORDER — PROPOFOL 10 MG/ML
INJECTION, EMULSION INTRAVENOUS
Status: DISPENSED
Start: 2021-06-14